# Patient Record
Sex: FEMALE | Race: WHITE | Employment: FULL TIME | URBAN - METROPOLITAN AREA
[De-identification: names, ages, dates, MRNs, and addresses within clinical notes are randomized per-mention and may not be internally consistent; named-entity substitution may affect disease eponyms.]

---

## 2023-12-27 ENCOUNTER — HOSPITAL ENCOUNTER (INPATIENT)
Facility: HOSPITAL | Age: 33
LOS: 7 days | Discharge: HOME HEALTH | DRG: 885 | End: 2024-01-03
Attending: STUDENT IN AN ORGANIZED HEALTH CARE EDUCATION/TRAINING PROGRAM | Admitting: PSYCHIATRY & NEUROLOGY
Payer: COMMERCIAL

## 2023-12-27 DIAGNOSIS — F41.9 ANXIETY: Primary | ICD-10-CM

## 2023-12-27 DIAGNOSIS — F99 MENTAL HEALTH DISORDER: ICD-10-CM

## 2023-12-27 PROBLEM — F39 UNSPECIFIED MOOD (AFFECTIVE) DISORDER (HCC): Status: ACTIVE | Noted: 2023-12-27

## 2023-12-27 LAB
ALBUMIN SERPL-MCNC: 3.5 G/DL (ref 3.5–5)
ALBUMIN/GLOB SERPL: 0.9 (ref 1.1–2.2)
ALP SERPL-CCNC: 76 U/L (ref 45–117)
ALT SERPL-CCNC: 11 U/L (ref 12–78)
AMPHET UR QL SCN: NEGATIVE
ANION GAP SERPL CALC-SCNC: 5 MMOL/L (ref 5–15)
APAP SERPL-MCNC: <2 UG/ML (ref 10–30)
APPEARANCE UR: ABNORMAL
AST SERPL-CCNC: 12 U/L (ref 15–37)
BACTERIA URNS QL MICRO: NEGATIVE /HPF
BARBITURATES UR QL SCN: NEGATIVE
BASOPHILS # BLD: 0.1 K/UL (ref 0–0.1)
BASOPHILS NFR BLD: 1 % (ref 0–1)
BENZODIAZ UR QL: NEGATIVE
BILIRUB SERPL-MCNC: 0.3 MG/DL (ref 0.2–1)
BILIRUB UR QL: NEGATIVE
BUN SERPL-MCNC: 4 MG/DL (ref 6–20)
BUN/CREAT SERPL: 6 (ref 12–20)
CALCIUM SERPL-MCNC: 8.8 MG/DL (ref 8.5–10.1)
CANNABINOIDS UR QL SCN: POSITIVE
CHLORIDE SERPL-SCNC: 108 MMOL/L (ref 97–108)
CO2 SERPL-SCNC: 27 MMOL/L (ref 21–32)
COCAINE UR QL SCN: NEGATIVE
COLOR UR: ABNORMAL
COMMENT:: NORMAL
CREAT SERPL-MCNC: 0.69 MG/DL (ref 0.55–1.02)
DIFFERENTIAL METHOD BLD: NORMAL
EOSINOPHIL # BLD: 0.4 K/UL (ref 0–0.4)
EOSINOPHIL NFR BLD: 6 % (ref 0–7)
EPITH CASTS URNS QL MICRO: ABNORMAL /LPF
ERYTHROCYTE [DISTWIDTH] IN BLOOD BY AUTOMATED COUNT: 12.5 % (ref 11.5–14.5)
ETHANOL SERPL-MCNC: <10 MG/DL (ref 0–0.08)
GLOBULIN SER CALC-MCNC: 3.8 G/DL (ref 2–4)
GLUCOSE SERPL-MCNC: 95 MG/DL (ref 65–100)
GLUCOSE UR STRIP.AUTO-MCNC: NEGATIVE MG/DL
HCG UR QL: NEGATIVE
HCT VFR BLD AUTO: 41 % (ref 35–47)
HGB BLD-MCNC: 13.8 G/DL (ref 11.5–16)
HGB UR QL STRIP: NEGATIVE
HYALINE CASTS URNS QL MICRO: ABNORMAL /LPF (ref 0–5)
IMM GRANULOCYTES # BLD AUTO: 0 K/UL (ref 0–0.04)
IMM GRANULOCYTES NFR BLD AUTO: 0 % (ref 0–0.5)
KETONES UR QL STRIP.AUTO: NEGATIVE MG/DL
LEUKOCYTE ESTERASE UR QL STRIP.AUTO: NEGATIVE
LYMPHOCYTES # BLD: 2.2 K/UL (ref 0.8–3.5)
LYMPHOCYTES NFR BLD: 35 % (ref 12–49)
Lab: ABNORMAL
MCH RBC QN AUTO: 29.5 PG (ref 26–34)
MCHC RBC AUTO-ENTMCNC: 33.7 G/DL (ref 30–36.5)
MCV RBC AUTO: 87.6 FL (ref 80–99)
METHADONE UR QL: NEGATIVE
MONOCYTES # BLD: 0.6 K/UL (ref 0–1)
MONOCYTES NFR BLD: 9 % (ref 5–13)
NEUTS SEG # BLD: 3.2 K/UL (ref 1.8–8)
NEUTS SEG NFR BLD: 49 % (ref 32–75)
NITRITE UR QL STRIP.AUTO: NEGATIVE
NRBC # BLD: 0 K/UL (ref 0–0.01)
NRBC BLD-RTO: 0 PER 100 WBC
OPIATES UR QL: NEGATIVE
PCP UR QL: NEGATIVE
PH UR STRIP: 8.5 (ref 5–8)
PLATELET # BLD AUTO: 317 K/UL (ref 150–400)
PMV BLD AUTO: 9.8 FL (ref 8.9–12.9)
POTASSIUM SERPL-SCNC: 3.9 MMOL/L (ref 3.5–5.1)
PROT SERPL-MCNC: 7.3 G/DL (ref 6.4–8.2)
PROT UR STRIP-MCNC: NEGATIVE MG/DL
RBC # BLD AUTO: 4.68 M/UL (ref 3.8–5.2)
RBC #/AREA URNS HPF: ABNORMAL /HPF (ref 0–5)
SALICYLATES SERPL-MCNC: <1.7 MG/DL (ref 2.8–20)
SARS-COV-2 RNA RESP QL NAA+PROBE: NOT DETECTED
SODIUM SERPL-SCNC: 140 MMOL/L (ref 136–145)
SOURCE: NORMAL
SP GR UR REFRACTOMETRY: 1.02 (ref 1–1.03)
SPECIMEN HOLD: NORMAL
URINE CULTURE IF INDICATED: ABNORMAL
UROBILINOGEN UR QL STRIP.AUTO: 1 EU/DL (ref 0.2–1)
WBC # BLD AUTO: 6.4 K/UL (ref 3.6–11)
WBC URNS QL MICRO: ABNORMAL /HPF (ref 0–4)

## 2023-12-27 PROCEDURE — 80143 DRUG ASSAY ACETAMINOPHEN: CPT

## 2023-12-27 PROCEDURE — 87635 SARS-COV-2 COVID-19 AMP PRB: CPT

## 2023-12-27 PROCEDURE — 1240000000 HC EMOTIONAL WELLNESS R&B

## 2023-12-27 PROCEDURE — 82077 ASSAY SPEC XCP UR&BREATH IA: CPT

## 2023-12-27 PROCEDURE — 99285 EMERGENCY DEPT VISIT HI MDM: CPT

## 2023-12-27 PROCEDURE — 80053 COMPREHEN METABOLIC PANEL: CPT

## 2023-12-27 PROCEDURE — 80307 DRUG TEST PRSMV CHEM ANLYZR: CPT

## 2023-12-27 PROCEDURE — 36415 COLL VENOUS BLD VENIPUNCTURE: CPT

## 2023-12-27 PROCEDURE — 90791 PSYCH DIAGNOSTIC EVALUATION: CPT

## 2023-12-27 PROCEDURE — 80179 DRUG ASSAY SALICYLATE: CPT

## 2023-12-27 PROCEDURE — 81025 URINE PREGNANCY TEST: CPT

## 2023-12-27 PROCEDURE — 85025 COMPLETE CBC W/AUTO DIFF WBC: CPT

## 2023-12-27 PROCEDURE — 6370000000 HC RX 637 (ALT 250 FOR IP): Performed by: STUDENT IN AN ORGANIZED HEALTH CARE EDUCATION/TRAINING PROGRAM

## 2023-12-27 PROCEDURE — 81001 URINALYSIS AUTO W/SCOPE: CPT

## 2023-12-27 RX ORDER — DIPHENHYDRAMINE HYDROCHLORIDE 50 MG/ML
50 INJECTION INTRAMUSCULAR; INTRAVENOUS EVERY 4 HOURS PRN
Status: CANCELLED | OUTPATIENT
Start: 2023-12-27

## 2023-12-27 RX ORDER — HALOPERIDOL 5 MG/ML
5 INJECTION INTRAMUSCULAR EVERY 4 HOURS PRN
Status: CANCELLED | OUTPATIENT
Start: 2023-12-27

## 2023-12-27 RX ORDER — HALOPERIDOL 5 MG/1
5 TABLET ORAL EVERY 4 HOURS PRN
Status: CANCELLED | OUTPATIENT
Start: 2023-12-27

## 2023-12-27 RX ORDER — SENNOSIDES A AND B 8.6 MG/1
1 TABLET, FILM COATED ORAL DAILY PRN
Status: CANCELLED | OUTPATIENT
Start: 2023-12-27

## 2023-12-27 RX ORDER — TRAZODONE HYDROCHLORIDE 50 MG/1
50 TABLET ORAL NIGHTLY PRN
Status: CANCELLED | OUTPATIENT
Start: 2023-12-27

## 2023-12-27 RX ORDER — MAGNESIUM HYDROXIDE/ALUMINUM HYDROXICE/SIMETHICONE 120; 1200; 1200 MG/30ML; MG/30ML; MG/30ML
30 SUSPENSION ORAL EVERY 6 HOURS PRN
Status: CANCELLED | OUTPATIENT
Start: 2023-12-27

## 2023-12-27 RX ORDER — POLYETHYLENE GLYCOL 3350 17 G/17G
17 POWDER, FOR SOLUTION ORAL DAILY PRN
Status: CANCELLED | OUTPATIENT
Start: 2023-12-27

## 2023-12-27 RX ORDER — ACETAMINOPHEN 325 MG/1
650 TABLET ORAL EVERY 4 HOURS PRN
Status: CANCELLED | OUTPATIENT
Start: 2023-12-27

## 2023-12-27 RX ORDER — HYDROXYZINE 50 MG/1
50 TABLET, FILM COATED ORAL 3 TIMES DAILY PRN
Status: CANCELLED | OUTPATIENT
Start: 2023-12-27

## 2023-12-27 RX ORDER — LORAZEPAM 1 MG/1
1 TABLET ORAL ONCE
Status: COMPLETED | OUTPATIENT
Start: 2023-12-27 | End: 2023-12-27

## 2023-12-27 RX ADMIN — LORAZEPAM 1 MG: 1 TABLET ORAL at 20:36

## 2023-12-27 ASSESSMENT — LIFESTYLE VARIABLES
HOW OFTEN DO YOU HAVE A DRINK CONTAINING ALCOHOL: NEVER
HOW MANY STANDARD DRINKS CONTAINING ALCOHOL DO YOU HAVE ON A TYPICAL DAY: PATIENT DOES NOT DRINK

## 2023-12-27 ASSESSMENT — PAIN - FUNCTIONAL ASSESSMENT
PAIN_FUNCTIONAL_ASSESSMENT: NONE - DENIES PAIN
PAIN_FUNCTIONAL_ASSESSMENT: NONE - DENIES PAIN

## 2023-12-27 ASSESSMENT — SLEEP AND FATIGUE QUESTIONNAIRES
DO YOU HAVE DIFFICULTY SLEEPING: YES
DO YOU USE A SLEEP AID: NO
AVERAGE NUMBER OF SLEEP HOURS: 6

## 2023-12-27 NOTE — ED TRIAGE NOTES
Patient is coming in with complaints of panic attacks and medications is not working that has been going on for awhile. Denies SI/HI.

## 2023-12-27 NOTE — ED PROVIDER NOTES
[facility] EMERGENCY DEP  EMERGENCY DEPARTMENT ENCOUNTER      Pt Name: Lexii Gaytan  MRN: [id number]  Birthdate 1990  Date of evaluation: 12/27/2023  Provider: Azra Hackett DO    CHIEF COMPLAINT       Chief Complaint   Patient presents with    Anxiety         HISTORY OF PRESENT ILLNESS    HPI    Lexii Gaytan is a 33 y.o. female with a history of anxiety and depression who presents to the emergency department for evaluation of anxiety. Patient reports worsening anxiety and panic attacks over the last several weeks. States she had her medications adjusted mid December without improvement of symptoms. States she is having difficultly concentrating and thinking due to her symptoms. Denies SI or HI.     Nursing Notes were reviewed.    REVIEW OF SYSTEMS       Review of Systems   Constitutional:  Negative for fever.   Eyes:  Negative for discharge and redness.   Respiratory:  Negative for shortness of breath.    Neurological:  Negative for speech difficulty.   Psychiatric/Behavioral:  Positive for agitation and decreased concentration. Negative for suicidal ideas. The patient is nervous/anxious.            PAST MEDICAL HISTORY     Past Medical History:   Diagnosis Date    Depression     Panic attacks     Scoliosis          SURGICAL HISTORY       Past Surgical History:   Procedure Laterality Date    WISDOM TOOTH EXTRACTION           CURRENT MEDICATIONS       Previous Medications    No medications on file       ALLERGIES     Patient has no known allergies.    FAMILY HISTORY     History reviewed. No pertinent family history.       SOCIAL HISTORY       Social History     Socioeconomic History    Marital status: Single     Spouse name: None    Number of children: None    Years of education: None    Highest education level: None   Tobacco Use    Smoking status: Former     Types: Cigarettes     Passive exposure: Never    Smokeless tobacco: Never   Substance and Sexual Activity    Alcohol use: Not Currently

## 2023-12-28 PROCEDURE — 6370000000 HC RX 637 (ALT 250 FOR IP): Performed by: NURSE PRACTITIONER

## 2023-12-28 PROCEDURE — 6370000000 HC RX 637 (ALT 250 FOR IP)

## 2023-12-28 PROCEDURE — 1240000000 HC EMOTIONAL WELLNESS R&B

## 2023-12-28 RX ORDER — LORAZEPAM 1 MG/1
1 TABLET ORAL 2 TIMES DAILY PRN
Status: ON HOLD | COMMUNITY
End: 2024-01-03 | Stop reason: HOSPADM

## 2023-12-28 RX ORDER — MAGNESIUM HYDROXIDE/ALUMINUM HYDROXICE/SIMETHICONE 120; 1200; 1200 MG/30ML; MG/30ML; MG/30ML
30 SUSPENSION ORAL EVERY 6 HOURS PRN
Status: DISCONTINUED | OUTPATIENT
Start: 2023-12-28 | End: 2024-01-03 | Stop reason: HOSPADM

## 2023-12-28 RX ORDER — LURASIDONE HYDROCHLORIDE 20 MG/1
20 TABLET, FILM COATED ORAL
Status: DISCONTINUED | OUTPATIENT
Start: 2023-12-28 | End: 2023-12-29

## 2023-12-28 RX ORDER — HALOPERIDOL 5 MG/1
5 TABLET ORAL EVERY 4 HOURS PRN
Status: DISCONTINUED | OUTPATIENT
Start: 2023-12-28 | End: 2024-01-03 | Stop reason: HOSPADM

## 2023-12-28 RX ORDER — POLYETHYLENE GLYCOL 3350 17 G/17G
17 POWDER, FOR SOLUTION ORAL DAILY PRN
Status: DISCONTINUED | OUTPATIENT
Start: 2023-12-28 | End: 2024-01-03 | Stop reason: HOSPADM

## 2023-12-28 RX ORDER — HYDROXYZINE 50 MG/1
50 TABLET, FILM COATED ORAL 3 TIMES DAILY PRN
Status: DISCONTINUED | OUTPATIENT
Start: 2023-12-28 | End: 2024-01-03 | Stop reason: HOSPADM

## 2023-12-28 RX ORDER — CITALOPRAM 40 MG/1
40 TABLET ORAL DAILY
Status: ON HOLD | COMMUNITY
End: 2024-01-03 | Stop reason: HOSPADM

## 2023-12-28 RX ORDER — ACETAMINOPHEN 325 MG/1
650 TABLET ORAL EVERY 4 HOURS PRN
Status: DISCONTINUED | OUTPATIENT
Start: 2023-12-28 | End: 2024-01-03 | Stop reason: HOSPADM

## 2023-12-28 RX ORDER — DIPHENHYDRAMINE HYDROCHLORIDE 50 MG/ML
50 INJECTION INTRAMUSCULAR; INTRAVENOUS EVERY 4 HOURS PRN
Status: DISCONTINUED | OUTPATIENT
Start: 2023-12-28 | End: 2024-01-03 | Stop reason: HOSPADM

## 2023-12-28 RX ORDER — BUPROPION HYDROCHLORIDE 100 MG/1
100 TABLET ORAL 2 TIMES DAILY
Status: ON HOLD | COMMUNITY
End: 2024-01-03 | Stop reason: HOSPADM

## 2023-12-28 RX ORDER — SENNOSIDES A AND B 8.6 MG/1
1 TABLET, FILM COATED ORAL DAILY PRN
Status: DISCONTINUED | OUTPATIENT
Start: 2023-12-28 | End: 2024-01-03 | Stop reason: HOSPADM

## 2023-12-28 RX ORDER — HALOPERIDOL 5 MG/ML
5 INJECTION INTRAMUSCULAR EVERY 4 HOURS PRN
Status: DISCONTINUED | OUTPATIENT
Start: 2023-12-28 | End: 2024-01-03 | Stop reason: HOSPADM

## 2023-12-28 RX ORDER — DEXTROAMPHETAMINE SACCHARATE, AMPHETAMINE ASPARTATE, DEXTROAMPHETAMINE SULFATE AND AMPHETAMINE SULFATE 2.5; 2.5; 2.5; 2.5 MG/1; MG/1; MG/1; MG/1
10 TABLET ORAL 2 TIMES DAILY
Status: ON HOLD | COMMUNITY
End: 2024-01-03 | Stop reason: HOSPADM

## 2023-12-28 RX ORDER — TRAZODONE HYDROCHLORIDE 50 MG/1
50 TABLET ORAL NIGHTLY PRN
Status: DISCONTINUED | OUTPATIENT
Start: 2023-12-28 | End: 2024-01-03 | Stop reason: HOSPADM

## 2023-12-28 RX ADMIN — HYDROXYZINE HYDROCHLORIDE 50 MG: 50 TABLET, FILM COATED ORAL at 20:51

## 2023-12-28 RX ADMIN — LURASIDONE HYDROCHLORIDE 20 MG: 20 TABLET ORAL at 17:27

## 2023-12-28 NOTE — INTERDISCIPLINARY ROUNDS
Behavioral Health Interdisciplinary Rounds     Patient Name: Lexii Gaytan  Age: 33 y.o.  Room/Bed:  728/  Primary Diagnosis: Unspecified mood (affective) disorder (HCC)   Admission Status: Voluntary    Readmission within 30 days: No  Power of  in place: No  Patient requires a blocked bed: Yes          Reason for blocked bed: Behavior  Sleep hours:        Participation in Care/Groups:    Medication Compliant?:     PRNS (last 24 hours): None   Restraints (last 24 hours):  No  __________________________________________________  OQ Admission Analysis Survey completed:  OQ Admission Analysis Survey score:  __________________________________________________     Alcohol screening (AUDIT) completed -     If applicable, date SBIRT discussed in treatment team AND documented:    Tobacco - patient is a smoker:    Illegal Drugs use:      24 hour chart check complete: Yes    _______________________________________________    Patient goal(s) for today:   Treatment team focus/goals:   Progress note: Patient met with treatment team for the first time since admission, prefers to use her middle name Alyssa. Patient denies SI/HI and AHVH at this time, reports recent episodes of panic attacks, intense flash backs and failing to thrive due to C-PTSD. Patient reports attempting to find providers for therapy and psychiatry but has not been successful. Patient is verbose and tangential, speaking with a slow speech pattern. Demonstrating difficulty answering questions directly. Patient brought book into treatment team \"The Body Keeps the Score\" and reports it has been difficult to read, NP advised to save reading the book for a later time due to the content of the book and the state of her anxiety at this time. Plan to start medications, KIYA to contact priscilla about visitation and access code number.    5454: KIYA spoke with priscilla to provide updates and details about the unit. KIYA spoke with patient mother as well to provide

## 2023-12-28 NOTE — H&P
[facility] BEHAVIORAL HEALTH  INITIAL PSYCHIATRIC INTERVIEW:    Name: Lexii Gaytan  MR#: [id number]  ACCOUNT#: [id number]  : 1990  ADMIT DATE: 2023    CHIEF COMPLAINT: \"I had a traumatic flashback that revealed other flashbacks.\"     HISTORY OF PRESENTING COMPLAINT:  This is a 33 y.o. female who is currently admitted to the acute psychiatric floor at [facility] on a voluntary basis for erratic behavior and possible psychosis. Her thought process is somewhat disorganized and quite perseverative. The pt describes flashbacks related to PTSD as the primary trigger for worsening mental health symptoms recently, and feels that she has begun to have flashbacks to other traumatic events she believes she had previously repressed. She also believes she is starting to have memories about traumatic experiences that other people in her life have suffered, and she is wondering if these events that happened to others may have also happened to her, but she is not sure. She feels her mood has been mostly \"even keeled\" outside of specific flashbacks. She isn't sure if she's awake or asleep during these flashbacks, or if she's lucid dreaming. She denies having any thoughts of hurting herself or others. Doesn't believe she's having auditory hallucinations, but she isn't entirely sure, [place]. She expresses concern with keeping her data private and secure, has referenced multiple times in the evaluation that this is because \"I'm a female and I live in the year .\" She has also mentioned devices multiple times, and expressed on separate occasions during evaluation her concern about protecting her data from \"any negative outside source,\" and does feel that this is related to the trauma she's experienced. She is currently reading a book about trauma that is also making her wonder if the traumas in the book have happened to her or her loved ones. She feels that part of

## 2023-12-28 NOTE — ED NOTES
TRANSFER - OUT REPORT:    Verbal report given to PARTH Watt on Lexii Gaytan  being transferred to 8 for routine progression of patient care       Report consisted of patient's Situation, Background, Assessment and   Recommendations(SBAR).     Information from the following report(s) Nurse Handoff Report, ED Encounter Summary, ED SBAR, Adult Overview, MAR, Med Rec Status, and Neuro Assessment was reviewed with the receiving nurse.    [place] Fall Assessment:    Presents to emergency department  because of falls (Syncope, seizure, or loss of consciousness): No  Age > 70: No  Altered Mental Status, Intoxication with alcohol or substance confusion (Disorientation, impaired judgment, poor safety awaremess, or inability to follow instructions): No  Impaired Mobility: Ambulates or transfers with assistive devices or assistance; Unable to ambulate or transer.: No  Nursing Judgement: No          Lines:       Opportunity for questions and clarification was provided.      Patient transported with:  Tech

## 2023-12-28 NOTE — DISCHARGE INSTRUCTIONS
DISCHARGE SUMMARY    NAME:Lexii Gaytan  : 1990  MRN: [id number]    The patient Lexii Gaytan exhibits the ability to control behavior in a less restrictive environment.  Patient's level of functioning is improving.  No assaultive/destructive behavior has been observed for the past 24 hours.  No suicidal/homicidal threat or behavior has been observed for the past 24 hours.  There is no evidence of serious medication side effects.  Patient has not been in physical or protective restraints for at least the past 24 hours.    If weapons involved, how are they secured? None    Is patient aware of and in agreement with discharge plan? Yes    Arrangements for medication:  Prescriptions filled through [facility], 30 day supply provided    Copy of discharge instructions to provider?: Yes    Arrangements for transportation home:  Family    Keep all follow up appointments as scheduled, continue to take prescribed medications per physician instructions.  Mental health crisis number:  911 or your local mental health crisis line number at [facility] at [phone]      Mental Health Emergency WARM LINE      [phone])      M-F: 9am to 9pm      Sat & Sun: 5pm - 9pm  National suicide prevention lines:                             7-144-IHRDCOK (3-337-104-0545)       2-209-356-TALK ([phone])    Crisis Text Line:  Text HOME to 058199

## 2023-12-28 NOTE — BSMART NOTE
BSMART assessment completed, and suicide risk level noted to be LOW. Primary Nurse BRITTANY and Charge Nurse Alexandra and Physician Dr. Hackett notified. Concerns not observed.         
Bed access notified of pt at 1309. Pt per Dr. Hackett is medically cleared. Bed access made aware now that pt medically clear for presentation to psychiatrist.   
Bsmart to get pt to conduct MSE prior to room being assigned in ER.  
Patient received bed placement via ACCESS at [facility] 728/01 via GLORIA Wiggins/Barbra ESCALERA. RN 2 RN [phone].    Cori Harrison LCSW  
Requested with Charge/Alexandra and Cleo/Martín pt needing protocol medical clearance for U admission (CMP, CBC, UA, UDS, ETOH, Covid, HCG).  
Assessment:    The potential for suicide noted by the following: not noted.  The potential for homicide is not noted.  The patient has not been a perpetrator of sexual or physical abuse.  There are not pending charges.  The patient is not felt to be at risk for self harm or harm to others.  The attending nurse was advised that security has not been notified.    Section III - Psychosocial  The patient's overall mood and attitude is manic like and tearful.  Feelings of helplessness and hopelessness are observed by pt presentation.  Generalized anxiety is observed by pt presentation and per report.  Panic is observed by pt report. Phobias are not observed.  Obsessive compulsive tendencies are not observed.      Section IV - Mental Status Exam  The patient's appearance unkempt with poor hygiene. The patient's behavior is agitated, is guarded, is manic, shows poor eye contact, is restless, and is rigid. The patient is oriented to time, place, person and situation.  The patient's speech is pressured, is soft, and is loud.  The patient's mood is depressed, is anxious, is withdrawn, is sad, is frightened, is irritable, and is expansive.  The range of affect is constricted and is flat.  The patient's thought content demonstrates paranoia .  The thought process is blocking and is tangential.  The patient's perception demonstrated changes in the following:  pt hinks thee may be happening auditory  visual hallucinations. The patient's memory is impaired.  The patient's appetite is decreased and shows signs of weight loss .  The patient's sleep has evidence of insomnia. The patient shows little insight.  The patient's judgement is psychologically impaired.      Section V - Substance Abuse  The patient is  using substances.  The patient is using cannabis smoked for 1-5 years with last use on x1 month ago. The patient has experienced the following withdrawal symptoms: N/A.    Section VI - Living Arrangements  The patient has

## 2023-12-29 PROCEDURE — 1240000000 HC EMOTIONAL WELLNESS R&B

## 2023-12-29 PROCEDURE — 6370000000 HC RX 637 (ALT 250 FOR IP)

## 2023-12-29 PROCEDURE — 6370000000 HC RX 637 (ALT 250 FOR IP): Performed by: NURSE PRACTITIONER

## 2023-12-29 RX ORDER — LURASIDONE HYDROCHLORIDE 20 MG/1
40 TABLET, FILM COATED ORAL
Status: DISCONTINUED | OUTPATIENT
Start: 2023-12-29 | End: 2023-12-31

## 2023-12-29 RX ADMIN — LURASIDONE HYDROCHLORIDE 40 MG: 20 TABLET ORAL at 17:12

## 2023-12-29 RX ADMIN — TRAZODONE HYDROCHLORIDE 50 MG: 50 TABLET ORAL at 21:16

## 2023-12-29 RX ADMIN — HYDROXYZINE HYDROCHLORIDE 50 MG: 50 TABLET, FILM COATED ORAL at 17:12

## 2023-12-29 NOTE — INTERDISCIPLINARY ROUNDS
Behavioral Health Interdisciplinary Rounds     Patient Name: Lexii Gaytan  Age: 33 y.o.  Room/Bed:  728/  Primary Diagnosis: Unspecified mood (affective) disorder (HCC)   Admission Status: Voluntary    Readmission within 30 days: No  Power of  in place: No  Patient requires a blocked bed: Yes          Reason for blocked bed: Paranoia  and Psychosis  Sleep hours: 7       Participation in Care/Groups:  No  Medication Compliant?: Yes  PRNS (last 24 hours): Antianxiety   Restraints (last 24 hours):  No  __________________________________________________  OQ Admission Analysis Survey completed:  OQ Admission Analysis Survey score:  __________________________________________________     Alcohol screening (AUDIT) completed -     If applicable, date SBIRT discussed in treatment team AND documented:    Tobacco - patient is a smoker:    Illegal Drugs use:      24 hour chart check complete: Yes    _______________________________________________    Patient goal(s) for today:   Treatment team focus/goals:   Progress note: Patient met with treatment team and appeared with an anxious, tearful and overwhelmed mood and affect. Patient is tearful, stating the unit is noisy and disruptive, too many doors closing and bright lights, stating being in the hospital is traumatizing. Patient states she slept well last night and denies side effects from medications at this time. Patient presenting with thought blocking and slow speaking pattern. Patient is sobbing throughout session, requesting to go home. Patient informed of TDO process, patient agreeing to stay voluntarily.    1520: Referral for [facility] sent, program called and patient too anxious to speak at this time. Nursing informed SW that patient is afraid of electronics at this time and remains highly anxious as seen this morning.    1530: SW spoke with patient mother and fiance to provide updates. Family to visit this evening.    LOS:  2  Expected LOS:

## 2023-12-30 PROCEDURE — 1240000000 HC EMOTIONAL WELLNESS R&B

## 2023-12-30 PROCEDURE — 6370000000 HC RX 637 (ALT 250 FOR IP): Performed by: NURSE PRACTITIONER

## 2023-12-30 PROCEDURE — 6370000000 HC RX 637 (ALT 250 FOR IP)

## 2023-12-30 RX ADMIN — HALOPERIDOL 5 MG: 5 TABLET ORAL at 14:15

## 2023-12-30 RX ADMIN — LURASIDONE HYDROCHLORIDE 40 MG: 20 TABLET ORAL at 16:47

## 2023-12-30 RX ADMIN — HYDROXYZINE HYDROCHLORIDE 50 MG: 50 TABLET, FILM COATED ORAL at 14:15

## 2023-12-30 RX ADMIN — HYDROXYZINE HYDROCHLORIDE 50 MG: 50 TABLET, FILM COATED ORAL at 09:34

## 2023-12-30 RX ADMIN — HYDROXYZINE HYDROCHLORIDE 50 MG: 50 TABLET, FILM COATED ORAL at 18:17

## 2023-12-30 RX ADMIN — TRAZODONE HYDROCHLORIDE 50 MG: 50 TABLET ORAL at 20:54

## 2023-12-31 LAB
CHOLEST SERPL-MCNC: 148 MG/DL
EST. AVERAGE GLUCOSE BLD GHB EST-MCNC: 94 MG/DL
HBA1C MFR BLD: 4.9 % (ref 4–5.6)
HDLC SERPL-MCNC: 42 MG/DL
HDLC SERPL: 3.5 (ref 0–5)
LDLC SERPL CALC-MCNC: 88.8 MG/DL (ref 0–100)
TRIGL SERPL-MCNC: 86 MG/DL
VLDLC SERPL CALC-MCNC: 17.2 MG/DL

## 2023-12-31 PROCEDURE — 6370000000 HC RX 637 (ALT 250 FOR IP): Performed by: NURSE PRACTITIONER

## 2023-12-31 PROCEDURE — 1240000000 HC EMOTIONAL WELLNESS R&B

## 2023-12-31 PROCEDURE — 6370000000 HC RX 637 (ALT 250 FOR IP)

## 2023-12-31 PROCEDURE — 36415 COLL VENOUS BLD VENIPUNCTURE: CPT

## 2023-12-31 PROCEDURE — 80061 LIPID PANEL: CPT

## 2023-12-31 PROCEDURE — 83036 HEMOGLOBIN GLYCOSYLATED A1C: CPT

## 2023-12-31 RX ORDER — LURASIDONE HYDROCHLORIDE 20 MG/1
60 TABLET, FILM COATED ORAL
Status: DISCONTINUED | OUTPATIENT
Start: 2023-12-31 | End: 2024-01-03 | Stop reason: HOSPADM

## 2023-12-31 RX ORDER — DIPHENHYDRAMINE HCL 25 MG
50 CAPSULE ORAL ONCE
Status: DISCONTINUED | OUTPATIENT
Start: 2023-12-31 | End: 2024-01-03 | Stop reason: HOSPADM

## 2023-12-31 RX ORDER — LORAZEPAM 0.5 MG/1
0.5 TABLET ORAL 3 TIMES DAILY
Status: DISCONTINUED | OUTPATIENT
Start: 2023-12-31 | End: 2024-01-01

## 2023-12-31 RX ADMIN — LURASIDONE HYDROCHLORIDE 60 MG: 20 TABLET ORAL at 16:52

## 2023-12-31 RX ADMIN — LORAZEPAM 0.5 MG: 0.5 TABLET ORAL at 13:53

## 2023-12-31 RX ADMIN — HYDROXYZINE HYDROCHLORIDE 50 MG: 50 TABLET, FILM COATED ORAL at 11:06

## 2023-12-31 RX ADMIN — TRAZODONE HYDROCHLORIDE 50 MG: 50 TABLET ORAL at 20:58

## 2023-12-31 RX ADMIN — ACETAMINOPHEN 650 MG: 325 TABLET ORAL at 14:42

## 2023-12-31 RX ADMIN — LORAZEPAM 0.5 MG: 0.5 TABLET ORAL at 20:58

## 2023-12-31 RX ADMIN — ACETAMINOPHEN 650 MG: 325 TABLET ORAL at 20:58

## 2023-12-31 RX ADMIN — HYDROXYZINE HYDROCHLORIDE 50 MG: 50 TABLET, FILM COATED ORAL at 20:58

## 2023-12-31 ASSESSMENT — PAIN DESCRIPTION - DESCRIPTORS: DESCRIPTORS: DISCOMFORT

## 2023-12-31 ASSESSMENT — PAIN SCALES - GENERAL
PAINLEVEL_OUTOF10: 2
PAINLEVEL_OUTOF10: 4
PAINLEVEL_OUTOF10: 0

## 2023-12-31 ASSESSMENT — PAIN DESCRIPTION - LOCATION: LOCATION: NECK

## 2023-12-31 ASSESSMENT — PAIN SCALES - WONG BAKER
WONGBAKER_NUMERICALRESPONSE: 2
WONGBAKER_NUMERICALRESPONSE: 0

## 2024-01-01 PROCEDURE — 1240000000 HC EMOTIONAL WELLNESS R&B

## 2024-01-01 PROCEDURE — 6370000000 HC RX 637 (ALT 250 FOR IP): Performed by: NURSE PRACTITIONER

## 2024-01-01 RX ORDER — LORAZEPAM 1 MG/1
1 TABLET ORAL 3 TIMES DAILY
Status: DISCONTINUED | OUTPATIENT
Start: 2024-01-01 | End: 2024-01-02

## 2024-01-01 RX ADMIN — LORAZEPAM 0.5 MG: 0.5 TABLET ORAL at 08:45

## 2024-01-01 RX ADMIN — LORAZEPAM 1 MG: 1 TABLET ORAL at 20:30

## 2024-01-01 RX ADMIN — LORAZEPAM 1 MG: 1 TABLET ORAL at 14:01

## 2024-01-01 RX ADMIN — LURASIDONE HYDROCHLORIDE 60 MG: 20 TABLET ORAL at 17:27

## 2024-01-01 ASSESSMENT — PAIN SCALES - GENERAL
PAINLEVEL_OUTOF10: 0
PAINLEVEL_OUTOF10: 0

## 2024-01-01 ASSESSMENT — PAIN SCALES - WONG BAKER
WONGBAKER_NUMERICALRESPONSE: 0
WONGBAKER_NUMERICALRESPONSE: 0

## 2024-01-02 PROCEDURE — 6370000000 HC RX 637 (ALT 250 FOR IP): Performed by: NURSE PRACTITIONER

## 2024-01-02 PROCEDURE — 1240000000 HC EMOTIONAL WELLNESS R&B

## 2024-01-02 RX ORDER — LORAZEPAM 0.5 MG/1
0.5 TABLET ORAL 3 TIMES DAILY
Status: DISCONTINUED | OUTPATIENT
Start: 2024-01-02 | End: 2024-01-03 | Stop reason: HOSPADM

## 2024-01-02 RX ADMIN — LURASIDONE HYDROCHLORIDE 60 MG: 20 TABLET ORAL at 16:22

## 2024-01-02 RX ADMIN — LORAZEPAM 0.5 MG: 0.5 TABLET ORAL at 13:53

## 2024-01-02 RX ADMIN — LORAZEPAM 0.5 MG: 0.5 TABLET ORAL at 20:38

## 2024-01-02 RX ADMIN — LORAZEPAM 1 MG: 1 TABLET ORAL at 08:53

## 2024-01-02 NOTE — INTERDISCIPLINARY ROUNDS
Behavioral Health Interdisciplinary Rounds     Patient Name: Lexii Gaytan  Age: 33 y.o.  Room/Bed:  [facility]/  Primary Diagnosis: Unspecified mood (affective) disorder (HCC)   Admission Status: Voluntary    Readmission within 30 days: No  Power of  in place: No  Patient requires a blocked bed: Yes          Reason for blocked bed: Paranoia  Sleep hours: 5       Participation in Care/Groups:  Yes  Medication Compliant?: Yes  PRNS (last 24 hours): None   Restraints (last 24 hours):  No  __________________________________________________  OQ Admission Analysis Survey completed:  OQ Admission Analysis Survey score:  __________________________________________________     Alcohol screening (AUDIT) completed -     If applicable, date SBIRT discussed in treatment team AND documented:    Tobacco - patient is a smoker:    Illegal Drugs use:      24 hour chart check complete: Yes    _______________________________________________    Patient goal(s) for today:   Treatment team focus/goals:   Progress note: Patient met with treatment team and appeared with a fair mood and affect. Patient is more appropriately engaged in meeting today, patient presents as calm and mood appears stable. Patient denies SI/HI and AHVH at this time. Patient is more engaged with peers on the unit, is less isolative to her room. Plan to refer to [facility], family is preferable to this program due to location. Plan to discharge home tomorrow, plan to discharge home with medications. SW to follow up with family regarding discharge planning.     Spiritual Care Consult: Yes  Financial concerns/prescription coverage:  Insured  Family contact: Fiance  Family requesting physician contact today:  Yes  Discharge plan: Discharge home with PHP  Access to weapons : None                            Outpatient provider(s): ELICEO Kowalski    LOS:  6  Expected LOS: 7    Participating treatment team members: Lexii Gaytan, MENDEZ White, Yadira ARCHER

## 2024-01-03 VITALS
BODY MASS INDEX: 22.63 KG/M2 | WEIGHT: 135.8 LBS | HEIGHT: 65 IN | TEMPERATURE: 98.1 F | OXYGEN SATURATION: 100 % | RESPIRATION RATE: 18 BRPM | SYSTOLIC BLOOD PRESSURE: 122 MMHG | DIASTOLIC BLOOD PRESSURE: 93 MMHG | HEART RATE: 98 BPM

## 2024-01-03 PROCEDURE — 6370000000 HC RX 637 (ALT 250 FOR IP): Performed by: NURSE PRACTITIONER

## 2024-01-03 PROCEDURE — 6370000000 HC RX 637 (ALT 250 FOR IP)

## 2024-01-03 RX ORDER — LURASIDONE HYDROCHLORIDE 60 MG/1
60 TABLET, FILM COATED ORAL
Qty: 30 TABLET | Refills: 0 | Status: SHIPPED | OUTPATIENT
Start: 2024-01-03

## 2024-01-03 RX ORDER — HYDROXYZINE 50 MG/1
50 TABLET, FILM COATED ORAL 3 TIMES DAILY PRN
Qty: 30 TABLET | Refills: 0 | Status: SHIPPED | OUTPATIENT
Start: 2024-01-03 | End: 2024-01-13

## 2024-01-03 RX ORDER — TRAZODONE HYDROCHLORIDE 50 MG/1
50 TABLET ORAL NIGHTLY PRN
Qty: 30 TABLET | Refills: 0 | Status: SHIPPED | OUTPATIENT
Start: 2024-01-03 | End: 2024-01-03

## 2024-01-03 RX ORDER — TRAZODONE HYDROCHLORIDE 50 MG/1
50 TABLET ORAL NIGHTLY PRN
Qty: 30 TABLET | Refills: 0 | Status: SHIPPED | OUTPATIENT
Start: 2024-01-03

## 2024-01-03 RX ORDER — LURASIDONE HYDROCHLORIDE 60 MG/1
60 TABLET, FILM COATED ORAL
Qty: 30 TABLET | Refills: 0 | Status: SHIPPED | OUTPATIENT
Start: 2024-01-03 | End: 2024-01-03

## 2024-01-03 RX ORDER — HYDROXYZINE 50 MG/1
50 TABLET, FILM COATED ORAL 3 TIMES DAILY PRN
Qty: 30 TABLET | Refills: 0 | Status: SHIPPED | OUTPATIENT
Start: 2024-01-03 | End: 2024-01-03

## 2024-01-03 RX ORDER — LORAZEPAM 0.5 MG/1
0.5 TABLET ORAL 2 TIMES DAILY PRN
Qty: 28 TABLET | Refills: 0 | Status: SHIPPED | OUTPATIENT
Start: 2024-01-03 | End: 2024-01-03

## 2024-01-03 RX ORDER — LORAZEPAM 0.5 MG/1
0.5 TABLET ORAL 2 TIMES DAILY PRN
Qty: 28 TABLET | Refills: 0 | Status: SHIPPED | OUTPATIENT
Start: 2024-01-03 | End: 2024-02-02

## 2024-01-03 RX ADMIN — LORAZEPAM 0.5 MG: 0.5 TABLET ORAL at 08:25

## 2024-01-03 RX ADMIN — ACETAMINOPHEN 650 MG: 325 TABLET ORAL at 03:29

## 2024-01-03 ASSESSMENT — PAIN DESCRIPTION - ORIENTATION: ORIENTATION: INNER

## 2024-01-03 ASSESSMENT — PAIN DESCRIPTION - LOCATION: LOCATION: HEAD

## 2024-01-03 ASSESSMENT — PAIN SCALES - GENERAL
PAINLEVEL_OUTOF10: 0
PAINLEVEL_OUTOF10: 2

## 2024-01-03 ASSESSMENT — PAIN DESCRIPTION - DESCRIPTORS: DESCRIPTORS: ACHING

## 2024-01-03 NOTE — PROGRESS NOTES
Admission Medication Reconciliation:    Information obtained from:  patient interview, Insurance claims data, [place] of E[place], and [facility] [place]¹:  YES    Comments/Recommendations: Updated PTA meds/reviewed patient's allergies.    1)  The patient reports that she has been on bupropion for 5-6 years (she stopped in briefly in the fall but has been taking regularly) and last on 12/27 AM. She utilizes hydroxyzine PRN but is not sure if she receives benefit from it. She hints that she mostly uses it at the urging of her family and fiance. Up until a recent ED visit a few weeks ago when she stopped taking citalopram but had been on it for ~5-6 years as well. She was on dextroamphetamine/amphetamine but in the fall questioned whether she should continue it because it made her feel like she was running on a motor. Last dose in early November. She has been prescribed medical marijuana for anxiety and depression but understands that prescription psychiatric medications have greater evidence for these conditions. She is unable to provide details about her experience with lorazepam in early December.    2)  The [place] Prescription Monitoring Program () was assessed to determine fill history of any controlled medications. The patient has been receiving medical marijuana for over 2 years. Additionally, she fills dextroamphetamine/amphetamine on a monthly basis. Last filled 10/24/23 for #60/30 day supply.     3)  Medication changes (since last review):  Added  - bupropion 100 mg BID  - amphetamine 10 mg BID (last filled 10/24/23)  - lorazepam 1 mg BID PRN  - citalopram 40 mg daily  - medical marijuana   ¹RxQuery pharmacy benefit data reflects medications filled and processed through the patient's insurance, however this data does NOT capture whether the medication was picked up or is currently being taken by the patient.    Allergies:  Patient has no known allergies.    Significant PMH/Disease States:   Past 
Called fiance and mother yesterday evening to provide updates and obtain collateral. Answered questions, reviewed the plan and likely next steps.   
Called priscilla Goss \"Familia\" to provide updates and receive collateral. Told him about our session today and yesterday, advised him that we are seeing improvement, though she is still not at her baseline. Told him about medication adjustments too. He will visit today at 2. He is interested in a family meeting. Told him that if she continues to exhibit improvement, we can tentatively plan for a discharge this week, potentially Wednesday depending on the pt's progress.   
Laboratory Monitoring for Antipsychotics:    This patient is currently prescribed the following medication(s):   Current Facility-Administered Medications: acetaminophen (TYLENOL) tablet 650 mg, 650 mg, Oral, Q4H PRN  polyethylene glycol (GLYCOLAX) packet 17 g, 17 g, Oral, Daily PRN  senna (SENOKOT) tablet 8.6 mg, 1 tablet, Oral, Daily PRN  aluminum & magnesium hydroxide-simethicone (MAALOX) 200-200-20 MG/5ML suspension 30 mL, 30 mL, Oral, Q6H PRN  hydrOXYzine HCl (ATARAX) tablet 50 mg, 50 mg, Oral, TID PRN  haloperidol (HALDOL) tablet 5 mg, 5 mg, Oral, Q4H PRN **OR** haloperidol lactate (HALDOL) injection 5 mg, 5 mg, IntraMUSCular, Q4H PRN  diphenhydrAMINE (BENADRYL) injection 50 mg, 50 mg, IntraMUSCular, Q4H PRN  traZODone (DESYREL) tablet 50 mg, 50 mg, Oral, Nightly PRN  lurasidone (LATUDA) tablet 20 mg, 20 mg, Oral, Dinner    The following labs have been completed for monitoring of antipsychotics and/or mood stabilizers:    Height, Weight, BMI Estimation  Estimated body mass index is 22.6 kg/m² as calculated from the following:    Height as of this encounter: 1.651 m (5' 5\").    Weight as of this encounter: 61.6 kg (135 lb 12.9 oz).     Vital Signs/Blood Pressure  BP (!) 137/101   Pulse (!) 111   Temp 97.9 °F (36.6 °C) (Oral)   Resp 16   Ht 1.651 m (5' 5\")   Wt 61.6 kg (135 lb 12.9 oz)   SpO2 100%   BMI 22.60 kg/m²     Renal Function, Hepatic Function and Chemistry  Estimated Creatinine Clearance: 105 mL/min (based on SCr of 0.69 mg/dL).    Lab Results   Component Value Date/Time     12/27/2023 10:37 AM    K 3.9 12/27/2023 10:37 AM     12/27/2023 10:37 AM    CO2 27 12/27/2023 10:37 AM    BUN 4 12/27/2023 10:37 AM    GLOB 3.8 12/27/2023 10:37 AM    ALT 11 12/27/2023 10:37 AM    AST 12 12/27/2023 10:37 AM     Glucose/Hemoglobin A1c  No results found for: \"GLU\", \"GLUCPOC\"  No results found for: \"LABA1C\", \"QVN7TOTA\", \"EAG\"    Hematology  Lab Results   Component Value Date/Time    WBC 6.4 
NUTRITION    Reason for Assessment: Positive Nutrition Screen      Recommendations/Interventions/Plan:     -Continue Regular diet    -Add ONS     -Weigh weekly         Past Medical History:   Diagnosis Date    Depression     Panic attacks     Scoliosis          Pt seen for +MST (Unintended weight loss and poor appetite). .  Chart/labs/meds reviewed. Admitted with Anxiety, Mental health disorder, Unspecified mood (affective) disorder. Discussed with nursing-pt not appropriate to speak with at this time.    No weight history to verify weight changes/loss. Currently within IBW range. PO intake fair at this time-will add ONS to concentrate nutrient intake.      Current Nutrition Therapies:  Diet: Regular  Supplements: None ordered  Meal Intake:   No data found.  Supplement Intake:  No data found.      Weight Hx:  Wt Readings from Last 10 Encounters:   12/27/23 61.6 kg (135 lb 12.9 oz)         Estimated Nutrition Needs:   Energy Requirements Based On: Formula  Weight Used for Energy Requirements: Admission (61.6 kg)  Energy (kcal/day): 1725 (MSJ x 1.3)  Weight Used for Protein Requirements: Admission  Protein (g/day): 49-61 (.8-1g/kg)  Method Used for Fluid Requirements: ml/Kg  Fluid (ml/day): 2200 (35 ml/kg)        Recent Labs     12/27/23  1037   GLUCOSE 95   BUN 4*   CREATININE 0.69      K 3.9      CO2 27   CALCIUM 8.8         Caty Choudhury RD CNSC  Available via Direct Access Software    
Pharmacist Discharge Medication Reconciliation    Discharging Provider: Lexii Garduno NP    Significant PMH:   Past Medical History:   Diagnosis Date    Depression     Panic attacks     Scoliosis      Chief Complaint for this Admission:   Chief Complaint   Patient presents with    Anxiety     Allergies: Patient has no known allergies.    Discharge Medications:      Medication List        START taking these medications      hydrOXYzine HCl 50 MG tablet  Commonly known as: ATARAX  Take 1 tablet by mouth 3 times daily as needed for Anxiety     lurasidone 60 MG Tabs tablet  Commonly known as: LATUDA  Take 1 tablet by mouth Daily with supper     traZODone 50 MG tablet  Commonly known as: DESYREL  Take 1 tablet by mouth nightly as needed for Sleep            CHANGE how you take these medications      LORazepam 0.5 MG tablet  Commonly known as: ATIVAN  Take 1 tablet by mouth 2 times daily as needed for Anxiety for up to 30 days. Max Daily Amount: 1 mg  What changed:   medication strength  how much to take            STOP taking these medications      amphetamine-dextroamphetamine 10 MG tablet  Commonly known as: ADDERALL     buPROPion 100 MG tablet  Commonly known as: WELLBUTRIN     citalopram 40 MG tablet  Commonly known as: CELEXA     medical marijuana               Where to Get Your Medications        These medications were sent to [facility]/pharmacy #[id number] - [place], [place] - [address] [place] [address] -  [phone] - F [phone]  [address] [place] [id number]      Phone: [phone]   hydrOXYzine HCl 50 MG tablet  LORazepam 0.5 MG tablet  lurasidone 60 MG Tabs tablet  traZODone 50 MG tablet       The patient's chart, MAR and AVS were reviewed by Adrienne Moses RPH.  
  Component Value Date/Time    LABA1C 4.9 12/31/2023 07:49 AM    EAG 94 12/31/2023 07:49 AM       Hematology  Lab Results   Component Value Date/Time    WBC 6.4 12/27/2023 10:37 AM    RBC 4.68 12/27/2023 10:37 AM    HGB 13.8 12/27/2023 10:37 AM    HCT 41.0 12/27/2023 10:37 AM    MCV 87.6 12/27/2023 10:37 AM    MCH 29.5 12/27/2023 10:37 AM    MCHC 33.7 12/27/2023 10:37 AM    RDW 12.5 12/27/2023 10:37 AM     12/27/2023 10:37 AM       Lipids  Lab Results   Component Value Date/Time    CHOL 148 12/31/2023 07:49 AM    TRIG 86 12/31/2023 07:49 AM    HDL 42 12/31/2023 07:49 AM    LDLCALC 88.8 12/31/2023 07:49 AM    LABVLDL 17.2 12/31/2023 07:49 AM    CHOLHDLRATIO 3.5 12/31/2023 07:49 AM       Thyroid Function  No results found for: \"TSH\", \"TSH2\", \"TSH3\", \"TSHELE\", \"T3RIA\", \"T3UP\", \"FT3\", \"FT4\", \"FT4P\", \"T4\", \"T4P\", \"FT4T\", \"TT7\"    Pregnancy Status  Lab Results   Component Value Date/Time    PREGTESTUR Negative 12/27/2023 05:27 PM     Assessment/Plan:  Recommended baseline laboratory monitoring has been completed based on this patient's current medication regimen. No further interventions are needed at this time. Follow-up metabolic monitoring labs should be completed in 3 months (quarterly for the first year of antipsychotic therapy).     Adrienne Moses RPH

## 2024-01-03 NOTE — INTERDISCIPLINARY ROUNDS
Behavioral Health Interdisciplinary Rounds     Patient Name: Lexii LOPEZ Lukas  Age: 33 y.o.  Room/Bed:  [facility]  Primary Diagnosis: Unspecified mood (affective) disorder (HCC)   Admission Status: Voluntary    Readmission within 30 days: No  Power of  in place: No  Patient requires a blocked bed: No          Reason for blocked bed:   Sleep hours:        Participation in Care/Groups:  Yes  Medication Compliant?: Yes  PRNS (last 24 hours): None   Restraints (last 24 hours):  No  __________________________________________________  OQ Admission Analysis Survey completed:  OQ Admission Analysis Survey score:  __________________________________________________     Alcohol screening (AUDIT) completed -     If applicable, date SBIRT discussed in treatment team AND documented:    Tobacco - patient is a smoker:    Illegal Drugs use:      24 hour chart check complete: Yes    _______________________________________________    Patient goal(s) for today:   Treatment team focus/goals:   Progress note:      Spiritual Care Consult:   Financial concerns/prescription coverage:    Family contact:                        Family requesting physician contact today:    Discharge plan:   Access to weapons :                                                              Outpatient provider(s):   Patient's preferred phone number for follow up call :   Patient's preferred e-mail address :    LOS:  7  Expected LOS:     Participating treatment team members: Lexii Gaytan, * (assigned SW),

## 2024-01-03 NOTE — DISCHARGE SUMMARY
DISCHARGE SUMMARY  Some parts of the discharge summary are from the initial Psychiatric interview that was done on admission by the admitting psychiatrist.     Name: Lexii Gaytan  MR#: [id number]  Account#: [id number]  : 1990  Date of Admission: 2023    Date of Discharge: 1/3/2024     TYPE OF DISCHARGE:   REGULAR     INITIAL PSYCHIATRIC INTERVIEW:   CHIEF COMPLAINT: \"I had a traumatic flashback that revealed other flashbacks.\"      HISTORY OF PRESENTING COMPLAINT:  This is a 33 y.o. female who is currently admitted to the acute psychiatric floor at [facility] on a voluntary basis for erratic behavior and possible psychosis. Her thought process is somewhat disorganized and quite perseverative. The pt describes flashbacks related to PTSD as the primary trigger for worsening mental health symptoms recently, and feels that she has begun to have flashbacks to other traumatic events she believes she had previously repressed. She also believes she is starting to have memories about traumatic experiences that other people in her life have suffered, and she is wondering if these events that happened to others may have also happened to her, but she is not sure. She feels her mood has been mostly \"even keeled\" outside of specific flashbacks. She isn't sure if she's awake or asleep during these flashbacks, or if she's lucid dreaming. She denies having any thoughts of hurting herself or others. Doesn't believe she's having auditory hallucinations, but she isn't entirely sure, [place]. She expresses concern with keeping her data private and secure, has referenced multiple times in the evaluation that this is because \"I'm a female and I live in the year .\" She has also mentioned devices multiple times, and expressed on separate occasions during evaluation her concern about protecting her data from \"any negative outside source,\" and does feel that this is related to the

## 2024-01-03 NOTE — PLAN OF CARE
Problem: Anxiety  Goal: Will report anxiety at manageable levels  Description: INTERVENTIONS:  1. Administer medication as ordered  2. Teach and rehearse alternative coping skills  3. Provide emotional support with 1:1 interaction with staff  1/1/2024 1536 by Jenna Thornton, RN  Outcome: Progressing  Flowsheets (Taken 1/1/2024 1534)  Will report anxiety at manageable levels: Administer medication as ordered  1/1/2024 0801 by Yadira Pierce, RN  Outcome: Progressing     
  Problem: Anxiety  Goal: Will report anxiety at manageable levels  Description: INTERVENTIONS:  1. Administer medication as ordered  2. Teach and rehearse alternative coping skills  3. Provide emotional support with 1:1 interaction with staff  12/29/2023 1555 by Jenna Thornton RN  Outcome: Progressing  Flowsheets (Taken 12/29/2023 1552)  Will report anxiety at manageable levels: Administer medication as ordered  12/29/2023 1446 by Delphine Trejo, RN  Outcome: Not Progressing  Flowsheets (Taken 12/29/2023 0732 by Nury Archer, RN)  Will report anxiety at manageable levels: Provide emotional support with 1:1 interaction with staff     Problem: Coping  Goal: Pt/Family able to verbalize concerns and demonstrate effective coping strategies  Description: INTERVENTIONS:  1. Assist patient/family to identify coping skills, available support systems and cultural and spiritual values  2. Provide emotional support, including active listening and acknowledgement of concerns of patient and caregivers  3. Reduce environmental stimuli, as able  4. Instruct patient/family in relaxation techniques, as appropriate  5. Assess for spiritual pain/suffering and initiate Spiritual Care, Psychosocial Clinical Specialist consults as needed  12/29/2023 1555 by Jenna Thornton RN  Outcome: Progressing  Flowsheets (Taken 12/29/2023 1552)  Patient/family able to verbalize anxieties, fears, and concerns, and demonstrate effective coping: Assist patient/family to identify coping skills, available support systems and cultural and spiritual values  12/29/2023 1446 by Delphine Trejo, RN  Outcome: Not Progressing     Problem: Behavior  Goal: Pt/Family maintain appropriate behavior and adhere to behavioral management agreement, if implemented  Description: INTERVENTIONS:  1. Assess patient/family's coping skills and  non-compliant behavior (including use of illegal substances)  2. Notify security of behavior or suspected illegal 
  Problem: Anxiety  Goal: Will report anxiety at manageable levels  Description: INTERVENTIONS:  1. Administer medication as ordered  2. Teach and rehearse alternative coping skills  3. Provide emotional support with 1:1 interaction with staff  12/30/2023 0050 by James Bradshaw RN  Flowsheets (Taken 12/30/2023 0050)  Will report anxiety at manageable levels:   Administer medication as ordered   Teach and rehearse alternative coping skills     Problem: Anxiety  Goal: Will report anxiety at manageable levels  Description: INTERVENTIONS:  1. Administer medication as ordered  2. Teach and rehearse alternative coping skills  3. Provide emotional support with 1:1 interaction with staff  12/30/2023 0050 by James Bradshaw RN  Flowsheets (Taken 12/30/2023 0050)  Will report anxiety at manageable levels:   Administer medication as ordered   Teach and rehearse alternative coping skills     
  Problem: Anxiety  Goal: Will report anxiety at manageable levels  Description: INTERVENTIONS:  1. Administer medication as ordered  2. Teach and rehearse alternative coping skills  3. Provide emotional support with 1:1 interaction with staff  Outcome: Progressing     Problem: Behavior  Goal: Pt/Family maintain appropriate behavior and adhere to behavioral management agreement, if implemented  Description: INTERVENTIONS:  1. Assess patient/family's coping skills and  non-compliant behavior (including use of illegal substances)  2. Notify security of behavior or suspected illegal substances which indicate the need for search of the family and/or belongings  3. Encourage verbalization of thoughts and concerns in a socially appropriate manner  4. Utilize positive, consistent limit setting strategies supporting safety of patient, staff and others  5. Encourage participation in the decision making process about the behavioral management agreement  6. If a visitor's behavior poses a threat to safety call refer to organization policy.  7. Initiate consult with , Psychosocial CNS, Spiritual Care as appropriate  Outcome: Progressing     
  Problem: Anxiety  Goal: Will report anxiety at manageable levels  Description: INTERVENTIONS:  1. Administer medication as ordered  2. Teach and rehearse alternative coping skills  3. Provide emotional support with 1:1 interaction with staff  Outcome: Progressing     Problem: Coping  Goal: Pt/Family able to verbalize concerns and demonstrate effective coping strategies  Description: INTERVENTIONS:  1. Assist patient/family to identify coping skills, available support systems and cultural and spiritual values  2. Provide emotional support, including active listening and acknowledgement of concerns of patient and caregivers  3. Reduce environmental stimuli, as able  4. Instruct patient/family in relaxation techniques, as appropriate  5. Assess for spiritual pain/suffering and initiate Spiritual Care, Psychosocial Clinical Specialist consults as needed  Outcome: Progressing     Problem: Behavior  Goal: Pt/Family maintain appropriate behavior and adhere to behavioral management agreement, if implemented  Description: INTERVENTIONS:  1. Assess patient/family's coping skills and  non-compliant behavior (including use of illegal substances)  2. Notify security of behavior or suspected illegal substances which indicate the need for search of the family and/or belongings  3. Encourage verbalization of thoughts and concerns in a socially appropriate manner  4. Utilize positive, consistent limit setting strategies supporting safety of patient, staff and others  5. Encourage participation in the decision making process about the behavioral management agreement  6. If a visitor's behavior poses a threat to safety call refer to organization policy.  7. Initiate consult with , Psychosocial CNS, Spiritual Care as appropriate  Outcome: Progressing  Note: Received pt alert and oriented. Currently calm and anxious. Pt allows lab work with increased emotional support by staff. Meal complaint.   [id number]- Calm during phone 
  Problem: Anxiety  Goal: Will report anxiety at manageable levels  Description: INTERVENTIONS:  1. Administer medication as ordered  2. Teach and rehearse alternative coping skills  3. Provide emotional support with 1:1 interaction with staff  Outcome: slowly Progressing   Reports some anxiety but \"ativan is helping\"  Problem: Behavior  Goal: Pt/Family maintain appropriate behavior and adhere to behavioral management agreement, if implemented  Description: INTERVENTIONS:  1. Assess patient/family's coping skills and  non-compliant behavior (including use of illegal substances)  2. Notify security of behavior or suspected illegal substances which indicate the need for search of the family and/or belongings  3. Encourage verbalization of thoughts and concerns in a socially appropriate manner  4. Utilize positive, consistent limit setting strategies supporting safety of patient, staff and others  5. Encourage participation in the decision making process about the behavioral management agreement  6. If a visitor's behavior poses a threat to safety call refer to organization policy.  7. Initiate consult with , Psychosocial CNS, Spiritual Care as appropriate  Outcome:  Progressing  No behavioral issues  Problem: Safety - Adult  Goal: Free from fall injury    Outcome: Progressing     Problem: Coping  Goal: Pt/Family able to verbalize concerns and demonstrate effective coping strategies  Description: INTERVENTIONS:  1. Assist patient/family to identify coping skills, available support systems and cultural and spiritual values  2. Provide emotional support, including active listening and acknowledgement of concerns of patient and caregivers  3. Reduce environmental stimuli, as able  4. Instruct patient/family in relaxation techniques, as appropriate  5. Assess for spiritual pain/suffering and initiate Spiritual Care, Psychosocial Clinical Specialist consults as needed  Outcome: Not Progressing   PATIENT APPEARS 
  Problem: Coping  Goal: Pt/Family able to verbalize concerns and demonstrate effective coping strategies  Description: INTERVENTIONS:  1. Assist patient/family to identify coping skills, available support systems and cultural and spiritual values  2. Provide emotional support, including active listening and acknowledgement of concerns of patient and caregivers  3. Reduce environmental stimuli, as able  4. Instruct patient/family in relaxation techniques, as appropriate  5. Assess for spiritual pain/suffering and initiate Spiritual Care, Psychosocial Clinical Specialist consults as needed  1/2/2024 1557 by Jenna Thornton, RN  Outcome: Progressing  Flowsheets (Taken 1/2/2024 1555)  Patient/family able to verbalize anxieties, fears, and concerns, and demonstrate effective coping: Assist patient/family to identify coping skills, available support systems and cultural and spiritual values  1/2/2024 0812 by Yadira Pierce, RN  Outcome: Not Progressing     
  Problem: Coping  Goal: Pt/Family able to verbalize concerns and demonstrate effective coping strategies  Description: INTERVENTIONS:  1. Assist patient/family to identify coping skills, available support systems and cultural and spiritual values  2. Provide emotional support, including active listening and acknowledgement of concerns of patient and caregivers  3. Reduce environmental stimuli, as able  4. Instruct patient/family in relaxation techniques, as appropriate  5. Assess for spiritual pain/suffering and initiate Spiritual Care, Psychosocial Clinical Specialist consults as needed  12/30/2023 0742 by Delphine Trejo RN  Outcome: Not Progressing     Problem: Behavior  Goal: Pt/Family maintain appropriate behavior and adhere to behavioral management agreement, if implemented  Description: INTERVENTIONS:  1. Assess patient/family's coping skills and  non-compliant behavior (including use of illegal substances)  2. Notify security of behavior or suspected illegal substances which indicate the need for search of the family and/or belongings  3. Encourage verbalization of thoughts and concerns in a socially appropriate manner  4. Utilize positive, consistent limit setting strategies supporting safety of patient, staff and others  5. Encourage participation in the decision making process about the behavioral management agreement  6. If a visitor's behavior poses a threat to safety call refer to organization policy.  7. Initiate consult with , Psychosocial CNS, Spiritual Care as appropriate  Outcome: Not Progressing  Note: Received pt alert up in her room. Distracted with delayed verbal response. Anxious; mood and affect congruent.    Pt is tearful using the phone during the morning.   0936- pt is agreeable to po atarax. Education provided. Medication administered. Pt is up in her room writing in a journal.   8033- writer spoke with pt's priscilla Vicente Sanchez [phone]. Mr Sanchez express concern 
  Problem: Coping  Goal: Pt/Family able to verbalize concerns and demonstrate effective coping strategies  Description: INTERVENTIONS:  1. Assist patient/family to identify coping skills, available support systems and cultural and spiritual values  2. Provide emotional support, including active listening and acknowledgement of concerns of patient and caregivers  3. Reduce environmental stimuli, as able  4. Instruct patient/family in relaxation techniques, as appropriate  5. Assess for spiritual pain/suffering and initiate Spiritual Care, Psychosocial Clinical Specialist consults as needed  12/30/2023 0848 by Candy Suero, RN  Outcome: Not Progressing  12/30/2023 0742 by Delphine Trejo, RN  Outcome: Not Progressing  12/30/2023 0055 by James Bradshaw, RN  Flowsheets (Taken 12/30/2023 0055)  Patient/family able to verbalize anxieties, fears, and concerns, and demonstrate effective coping:   Assist patient/family to identify coping skills, available support systems and cultural and spiritual values   Reduce environmental stimuli, as able    0830: Patient received awake and alert and sitting quietly in room upon shift change. Patient was calm, cooperative and interacting appropriately during breakfast but later became very tearful and anxious in room, and on phone with fiance. Will continue to monitor q15 minutes for safety checks.     Blocked Bed Documentation:    Room number: 728  Type: Behavior  Rationale: Poor Self-Control  Anticipated duration: TBD qshift   Additional comments: Anxious, tearful, paranoid      0934:  PRN Medication Documentation    Specific patient behavior that led to need for PRN medication: Patient visibly anxious and tearful      Staff interventions attempted prior to PRN being given: Therapeutic communication, use of coping skills     PRN medication given: Atarax 50mg PO     Patient response/effectiveness of PRN medication: Will continue to monitor.     1200: Patient is participatory in treatment 
  Problem: Discharge Planning  Goal: Discharge to home or other facility with appropriate resources  12/28/2023 0012 by Debbie Montano RN  Outcome: Progressing     
  Problem: Discharge Planning  Goal: Discharge to home or other facility with appropriate resources  12/30/2023 0055 by James Bradshaw RN  Flowsheets (Taken 12/30/2023 0055)  Discharge to home or other facility with appropriate resources:   Identify barriers to discharge with patient and caregiver   Arrange for needed discharge resources and transportation as appropriate     Problem: Anxiety  Goal: Will report anxiety at manageable levels  Description: INTERVENTIONS:  1. Administer medication as ordered  2. Teach and rehearse alternative coping skills  3. Provide emotional support with 1:1 interaction with staff  12/30/2023 0055 by aJmes Bradshaw RN  Flowsheets (Taken 12/30/2023 0055)  Will report anxiety at manageable levels:   Administer medication as ordered   Teach and rehearse alternative coping skills     Problem: Coping  Goal: Pt/Family able to verbalize concerns and demonstrate effective coping strategies  Description: INTERVENTIONS:  1. Assist patient/family to identify coping skills, available support systems and cultural and spiritual values  2. Provide emotional support, including active listening and acknowledgement of concerns of patient and caregivers  3. Reduce environmental stimuli, as able  4. Instruct patient/family in relaxation techniques, as appropriate  5. Assess for spiritual pain/suffering and initiate Spiritual Care, Psychosocial Clinical Specialist consults as needed  12/30/2023 0055 by James Bradshaw RN  Flowsheets (Taken 12/30/2023 0055)  Patient/family able to verbalize anxieties, fears, and concerns, and demonstrate effective coping:   Assist patient/family to identify coping skills, available support systems and cultural and spiritual values   Reduce environmental stimuli, as able     Problem: Anxiety  Goal: Will report anxiety at manageable levels  Description: INTERVENTIONS:  1. Administer medication as ordered  2. Teach and rehearse alternative coping skills  3. Provide emotional support with 
  Problem: Discharge Planning  Goal: Discharge to home or other facility with appropriate resources  Flowsheets (Taken 12/31/2023 2231)  Discharge to home or other facility with appropriate resources:   Identify barriers to discharge with patient and caregiver   Arrange for needed discharge resources and transportation as appropriate     Problem: Anxiety  Goal: Will report anxiety at manageable levels  Description: INTERVENTIONS:  1. Administer medication as ordered  2. Teach and rehearse alternative coping skills  3. Provide emotional support with 1:1 interaction with staff  Flowsheets (Taken 12/31/2023 2231)  Will report anxiety at manageable levels:   Administer medication as ordered   Provide emotional support with 1:1 interaction with staff     
  Problem: Discharge Planning  Goal: Discharge to home or other facility with appropriate resources  Outcome: Adequate for Discharge   Plan to discharge to home with transport provided by mother.Filled prescriptions and valuables given to patient.  
  Problem: Discharge Planning  Goal: Discharge to home or other facility with appropriate resources  Outcome: Progressing     
  Problem: Safety - Adult  Goal: Free from fall injury  1/3/2024 0039 by Stacey Chisholm RN  Outcome: Progressing   Patient received resting quietly in bed. No signs of distress. Even and unlabored breathing. Staff will continue to monitor safety q15 and provide support.  
  Problem: Safety - Adult  Goal: Free from fall injury  Outcome: Progressing   Patient received resting quietly in bed. No signs of distress. Even and unlabored breathing. Staff will continue to monitor safety q15 and provide support.   
  Problem: Safety - Adult  Goal: Free from fall injury  Outcome: Progressing   Received pt in room resting in bed with eyes closed, appears to be sleeping. No respiratory distress noted as respirations are even and unlabored. Pathway is free of clutter. Q15 min safety checks in place.   
1740- pt is able to concentrate enough to answer menu questions for tomorrow with staff assistance. This is an improvement from earlier today.   1722-pt is up in her room eating dinner post shower. Medication compliant. Prn po 50 mg atarax administered for anxiety per pt's request.   Problem: Coping  Goal: Pt/Family able to verbalize concerns and demonstrate effective coping strategies  Description: INTERVENTIONS:  1. Assist patient/family to identify coping skills, available support systems and cultural and spiritual values  2. Provide emotional support, including active listening and acknowledgement of concerns of patient and caregivers  3. Reduce environmental stimuli, as able  4. Instruct patient/family in relaxation techniques, as appropriate  5. Assess for spiritual pain/suffering and initiate Spiritual Care, Psychosocial Clinical Specialist consults as needed  Outcome: Not Progressing     Problem: Behavior  Goal: Pt/Family maintain appropriate behavior and adhere to behavioral management agreement, if implemented  Description: INTERVENTIONS:  1. Assess patient/family's coping skills and  non-compliant behavior (including use of illegal substances)  2. Notify security of behavior or suspected illegal substances which indicate the need for search of the family and/or belongings  3. Encourage verbalization of thoughts and concerns in a socially appropriate manner  4. Utilize positive, consistent limit setting strategies supporting safety of patient, staff and others  5. Encourage participation in the decision making process about the behavioral management agreement  6. If a visitor's behavior poses a threat to safety call refer to organization policy.  7. Initiate consult with , Psychosocial CNS, Spiritual Care as appropriate  12/29/2023 1446 by Delphine Trejo RN  Outcome: Not Progressing  Note: Pt is anxious, sad, and paranoid. Poor eye contact. Labile mood. Pt becomes agitated and tearful in 
Problem: Anxiety  Goal: Will report anxiety at manageable levels  Description: INTERVENTIONS:  1. Administer medication as ordered  2. Teach and rehearse alternative coping skills  3. Provide emotional support with 1:1 interaction with staff  Outcome: Progressing     Problem: Coping  Goal: Pt/Family able to verbalize concerns and demonstrate effective coping strategies  Description: INTERVENTIONS:  1. Assist patient/family to identify coping skills, available support systems and cultural and spiritual values  2. Provide emotional support, including active listening and acknowledgement of concerns of patient and caregivers  3. Reduce environmental stimuli, as able  4. Instruct patient/family in relaxation techniques, as appropriate  5. Assess for spiritual pain/suffering and initiate Spiritual Care, Psychosocial Clinical Specialist consults as needed  Outcome: Progressing    Patient awake and alert, isolative to room.    Minimal interaction with staff and peers.   Patient denied SI/HI.   Will continue to monitor q15 minutes for safety checks.    [facility]  Master Treatment Plan for Lexii Gaytan    Date Treatment Plan Initiated: 12/28/23    Treatment Plan Modalities:  Type of Modality Amount  (x minutes) Frequency (x/week) Duration (x days) Name of Responsible Staff   Community & wrap-up meetings to encourage peer interactions 15 7 1 KATHY Briceño     Group psychotherapy to assist in building coping skills and internal controls 60 7 1 Katie Gillies MSW   Therapeutic activity groups to build coping skills 60 7 1 Katie GIllies MS   Psychoeducation in group setting to address:   Medication education   15 7 1 Preethi Moses PharmD   Coping skills   30 3 1 Vicente Wilkinson Jessica Scott   Relaxation techniques      PARTH Briceño   Symptom management      PARTH Arrington   Discharge planning   60 2 1 Yuli Metcalf   Spirituality    60 2 1 Chaplain BOSTON   60 1 1 volunteer 
Progressing   No inappropriate behavior exhibited.    There is apparent thought blocking.Focus is \"discharge so she \"can take care of her plants.\"Has taken shower and attended to hygiene.
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
